# Patient Record
Sex: FEMALE | Race: BLACK OR AFRICAN AMERICAN | URBAN - METROPOLITAN AREA
[De-identification: names, ages, dates, MRNs, and addresses within clinical notes are randomized per-mention and may not be internally consistent; named-entity substitution may affect disease eponyms.]

---

## 2017-05-15 ENCOUNTER — TELEPHONE (OUTPATIENT)
Dept: FAMILY MEDICINE | Facility: CLINIC | Age: 37
End: 2017-05-15

## 2017-05-17 ENCOUNTER — OFFICE VISIT (OUTPATIENT)
Dept: FAMILY MEDICINE | Facility: CLINIC | Age: 37
End: 2017-05-17

## 2017-05-17 VITALS
TEMPERATURE: 98.3 F | HEART RATE: 77 BPM | SYSTOLIC BLOOD PRESSURE: 128 MMHG | HEIGHT: 64 IN | WEIGHT: 260.13 LBS | OXYGEN SATURATION: 100 % | DIASTOLIC BLOOD PRESSURE: 87 MMHG | BODY MASS INDEX: 44.41 KG/M2

## 2017-05-17 DIAGNOSIS — Z71.84 TRAVEL ADVICE ENCOUNTER: Primary | ICD-10-CM

## 2017-05-17 DIAGNOSIS — Z23 NEED FOR VACCINATION: ICD-10-CM

## 2017-05-17 RX ORDER — ATOVAQUONE AND PROGUANIL HYDROCHLORIDE 250; 100 MG/1; MG/1
1 TABLET, FILM COATED ORAL DAILY
Qty: 50 TABLET | Refills: 0 | Status: SHIPPED | OUTPATIENT
Start: 2017-05-17

## 2017-05-17 RX ORDER — PRENATAL VIT/IRON FUM/FOLIC AC 27MG-0.8MG
1 TABLET ORAL
COMMUNITY
Start: 2017-03-13

## 2017-05-17 NOTE — MR AVS SNAPSHOT
"              After Visit Summary   2017    Estefany Grider    MRN: [id number]           Patient Information     Date Of Birth          1980        Visit Information        Provider Department      2017 2:10 PM Bill Hewitt MD [facility]        Today's Diagnoses     Travel advice encounter    -  1    Need for vaccination           Follow-ups after your visit        Who to contact     Please call your clinic at [phone] to:    Ask questions about your health    Make or cancel appointments    Discuss your medicines    Learn about your test results    Speak to your doctor   If you have compliments or concerns about an experience at your clinic, or if you wish to file a complaint, please contact [facility] Patient Relations at [phone] or email us at [email or web address]         Additional Information About Your Visit        MyChart Information     Contatta is an electronic gateway that provides easy, online access to your medical records. With Contatta, you can request a clinic appointment, read your test results, renew a prescription or communicate with your care team.     To sign up for Meddlet visit the website at [email or web address]   You will be asked to enter the access code listed below, as well as some personal information. Please follow the directions to create your username and password.     Your access code is: [email or web address]  Expires: 2017 12:45 PM     Your access code will  in 90 days. If you need help or a new code, please contact your [facility] or call [phone] for assistance.        Care EveryWhere ID     This is your Care EveryWhere ID. This could be used by other organizations to access your [place] medical records  [id number]        Your Vitals Were     Pulse Temperature Height Pulse Oximetry Breastfeeding? BMI (Body Mass Index)    77 98.3  F (36.8  C) (Oral) 5' 4\" (162.6 cm) " 100% Yes 44.65 kg/m2       Blood Pressure from Last 3 Encounters:   05/17/17 128/87    Weight from Last 3 Encounters:   05/17/17 260 lb 2 oz (118 kg)              We Performed the Following     ADMIN VACCINE, EACH ADDITIONAL     ADMIN VACCINE, INITIAL     HEPATITIS A VACCINE PED/ADOL-2 DOSE     MENINGOCOCCAL VACCINE,IM (Mentactra )     MMR VIRUS IMMUNIZATION, SUBCUT     TYPHOID VACCINE, IM          Today's Medication Changes          These changes are accurate as of: 5/17/17 11:59 PM.  If you have any questions, ask your nurse or doctor.               Start taking these medicines.        Dose/Directions    atovaquone-proguanil 250-100 MG per tablet   Commonly known as:  MALARONE   Used for:  Travel advice encounter   Started by:  Bill Hewitt MD        Dose:  1 tablet   Take 1 tablet by mouth daily Start 2 days before travel and continue 7 days after return.   Quantity:  50 tablet   Refills:  0            Where to get your medicines      These medications were sent to [facility] - [place], [place] - [address], [place] [address]     Phone:  [phone]     atovaquone-proguanil 250-100 MG per tablet                Primary Care Provider Office Phone # Fax #    Bill Hewitt [phone]       [address] [address]        Thank you!     Thank you for choosing [facility]  for your care. Our goal is always to provide you with excellent care. Hearing back from our patients is one way we can continue to improve our services. Please take a few minutes to complete the written survey that you may receive in the mail after your visit with us. Thank you!             Your Updated Medication List - Protect others around you: Learn how to safely use, store and throw away your medicines at www.disposemymeds.org.          This list is accurate as of: 5/17/17 11:59 PM.  Always use your most recent med list.                   Brand  Name Dispense Instructions for use    atovaquone-proguanil 250-100 MG per tablet    MALARONE    50 tablet    Take 1 tablet by mouth daily Start 2 days before travel and continue 7 days after return.       prenatal multivitamin  plus iron 27-0.8 MG Tabs per tablet      Take 1 tablet by mouth

## 2017-05-17 NOTE — PROGRESS NOTES
[facility] Travel Visit         Estefany Grider is a 37 year old female who presents for a pre-travel assessment visit.  She will be traveling to:    Destination(s):  SUBJECTIVE:  Patient is coming in today because she is going on a trip to [place] [facility].  She'll be in [facility] [facility] between 05/28 and 07/05/17.  She is going to visit her family.  She won't be leaving [facility].     Unfortunately, patient doesn't have any other records of immunizations.  She did sign Care Everywhere and I did review those immunizations.  In addition to that, she did have a rubella test  during her last pregnancy which said was she was immune.     She answers the general medical history questionnaire as everything negative.  She did have Nexplanon placed yesterday at her home clinic, which is [facility].  She doesn't smoke or drink alcohol.  She doesn't use IV drugs.  She doesn't plan to have intercourse while out of the country.         Estefany Grider has completed the travel questionnaire and it is reviewed: YES  Are there special circumstances which place this traveler at higher risk (List if 'yes')?: YES    (For women only)  Is patient currently pregnant or might become pregnant within three months of this trip? NO   Is she breastfeeding? YES    Past Medical History:   Diagnosis Date     Gestational diabetes          No current outpatient prescriptions on file prior to visit.  No current facility-administered medications on file prior to visit.       No Known Allergies    Immunizations, travel specific:  -- Yellow fever: Recommended and not previously vaccinated  -- Hep A: Immunity status unknown  -- Hep B: Immunity status unknown  -- Typhoid (IM: booster every 2 years; PO: booster every 5 years): Immunity status unknown  -- Rabies  (Data on the need for and timing of additional booster doses are not available): Immunity status unknown  -- Meningococcal meningitis Immunity status unknown   -- [place]zbek encephalitis (Data on  "the need for and timing of additional booster doses are not available):Immunity status unknown    Immunizations, routine adult:  -- Influenza UTD with immunization   -- Polio: Immunity status unknown  -- Diphtheria, Tetanus & Pertussis (DTaP): UTD with immunization   -- Measles/ Mumps/ Rubella: Immunity status unknown  -- Varicella: Immunity status unknown  -- Pneumococcal (PPSV23 (Pneumovax)): Immunity status unknown  -- Pneumococcal (PCV13 (Prevnar)): Immunity status unknown    Malaria prophylaxis:  Recommended (refer to Travax for destination-specific recommendation) YES       Review of Systems:     C: NEGATIVE for fever, chills, change in weight  E/M: NEGATIVE for ear, mouth and throat problems  R: NEGATIVE for significant cough or SOB  CV: NEGATIVE for chest pain, palpitations or peripheral edema          Objective:     /87  Pulse 77  Temp 98.3  F (36.8  C) (Oral)  Ht 5' 4\" (162.6 cm)  Wt 260 lb 2 oz (118 kg)  SpO2 100%  Breastfeeding? Yes  BMI 44.65 kg/m2  Body mass index is 44.65 kg/(m^2).    GENERAL: healthy, alert, well nourished, well hydrated, no distress  MENTAL STATUS EXAM:  Appearance/Behavior: No apparent distress, Neatly groomed, Dressed appropriately for weather and Appears stated age  Speech: Normal  Mood/Affect: normal affect  Insight: Adequate         Assessment and Plan     Based on patient's past history, review of immunization and immunity status, planned itinerary and current recommendations, the following are recommended:        Patient and I had a lengthy discussion regarding the fact that she is breastfeeding and her upcoming travel.  Patient is quite certain that she wants to travel because her mother is ill and she wants to see her mother.  She'll limit her travel to the [place] [facility].     Patient and I discussed yellow fever immunizations.  There is a caution with yellow fever and breastfeeding because  there have been cases of transmission of the virus to the " .  The mother is quite clear that she doesn't want to have yellow fever vaccine if there is any concern for transmission to the baby.  Mother and I discussed the risk for getting yellow fever.  The yellow fever vaccine isn't required for travel to this country; nonetheless, I've provided her with a card stating that she had a contraindication for yellow fever vaccine because she is breastfeeding.     As noted above, the mother isn't aware of any other immunizations.  She agrees to the immunizations given below.  She was very clear about avoidance precautions, as well as food and water precautions.     She understands the instructions for Malarone, and understand the importance of adherence.    Travel advice encounter  -     atovaquone-proguanil (MALARONE) 250-100 MG per tablet; Take 1 tablet by mouth daily Start 2 days before travel and continue 7 days after return.    Need for vaccination  -     ADMIN VACCINE, INITIAL  -     ADMIN VACCINE, EACH ADDITIONAL  -     TYPHOID VACCINE, IM  -     MMR VIRUS IMMUNIZATION, SUBCUT  -     HEPATITIS A VACCINE PED/ADOL-2 DOSE  -     MENINGOCOCCAL VACCINE,IM (Mentactra )    Patient is given a copy of the [facility] traveller report as well as destination-specific recommendations on sun protection, avoiding insect bites and travel safety.      Options for treatment and/or follow-up care were reviewed with the patient. Estefany Grider was engaged and actively involved in the decision making process. She verbalized understanding of the options discussed and was satisfied with the final plan.      Bill Hewitt MD

## 2017-08-05 ENCOUNTER — HEALTH MAINTENANCE LETTER (OUTPATIENT)
Age: 37
End: 2017-08-05

## 2021-05-26 ENCOUNTER — RECORDS - HEALTHEAST (OUTPATIENT)
Dept: ADMINISTRATIVE | Facility: CLINIC | Age: 41
End: 2021-05-26

## 2021-05-27 ENCOUNTER — RECORDS - HEALTHEAST (OUTPATIENT)
Dept: ADMINISTRATIVE | Facility: CLINIC | Age: 41
End: 2021-05-27

## 2021-05-28 ENCOUNTER — RECORDS - HEALTHEAST (OUTPATIENT)
Dept: ADMINISTRATIVE | Facility: CLINIC | Age: 41
End: 2021-05-28

## 2021-05-30 ENCOUNTER — RECORDS - HEALTHEAST (OUTPATIENT)
Dept: ADMINISTRATIVE | Facility: CLINIC | Age: 41
End: 2021-05-30

## 2021-05-31 ENCOUNTER — RECORDS - HEALTHEAST (OUTPATIENT)
Dept: ADMINISTRATIVE | Facility: CLINIC | Age: 41
End: 2021-05-31